# Patient Record
Sex: MALE | URBAN - METROPOLITAN AREA
[De-identification: names, ages, dates, MRNs, and addresses within clinical notes are randomized per-mention and may not be internally consistent; named-entity substitution may affect disease eponyms.]

---

## 2022-03-10 ENCOUNTER — APPOINTMENT (OUTPATIENT)
Dept: URBAN - METROPOLITAN AREA CLINIC 211 | Age: 47
Setting detail: DERMATOLOGY
End: 2022-03-13

## 2022-03-10 ENCOUNTER — APPOINTMENT (OUTPATIENT)
Dept: URBAN - METROPOLITAN AREA CLINIC 211 | Age: 47
Setting detail: DERMATOLOGY
End: 2022-03-11

## 2022-03-10 DIAGNOSIS — Z41.9 ENCOUNTER FOR PROCEDURE FOR PURPOSES OTHER THAN REMEDYING HEALTH STATE, UNSPECIFIED: ICD-10-CM

## 2022-03-10 DIAGNOSIS — Z71.89 OTHER SPECIFIED COUNSELING: ICD-10-CM

## 2022-03-10 PROCEDURE — OTHER SUNSCREEN RECOMMENDATIONS: OTHER

## 2022-03-10 PROCEDURE — OTHER COUNSELING: OTHER

## 2022-03-10 PROCEDURE — OTHER MIPS QUALITY: OTHER

## 2022-03-10 PROCEDURE — OTHER OTHER: OTHER

## 2022-03-10 PROCEDURE — OTHER OTHER (COSMETIC): OTHER

## 2022-03-10 ASSESSMENT — LOCATION DETAILED DESCRIPTION DERM
LOCATION DETAILED: LEFT POSTERIOR SHOULDER
LOCATION DETAILED: LEFT POSTERIOR SHOULDER

## 2022-03-10 ASSESSMENT — LOCATION SIMPLE DESCRIPTION DERM
LOCATION SIMPLE: LEFT SHOULDER
LOCATION SIMPLE: LEFT SHOULDER

## 2022-03-10 ASSESSMENT — LOCATION ZONE DERM
LOCATION ZONE: ARM
LOCATION ZONE: ARM

## 2022-03-10 NOTE — PROCEDURE: OTHER (COSMETIC)
BIBA as per pts daughter, has been feeling generalized weakness from home since Sunday. Pt fell 3 days ago.
Other (Free Text): Cosmetic Consultation\\nPE:dark hair on the upper arms and the shoulder, very well demarcated, rolling atrophic scars on the temple and CA on right and left cheek\\n\\nPLAN:\\n\\n1.  Rec laser hair removal for the upper arms and the shoulders x 6 treatments.  The procedure was reviewed in detail, r&b and post treatment expectations.  Rec pt come into office clean shaven.  Informational handout was given with a CQ.\\n\\n2.  Discussed the possibility of micro needling for the acne scars and if there is a defined edge to consider layering with TCA Cross.  Deferred final decision to when pt comes in to see Ioana for laser hair treatment to determine if she thinks the scars would be responsive to microneedling.\\n\\n3.  Rec electrocautery layered with IPL to 2 select Curtis on the bilateral cheeks.  The procedure was reviewed in detail, r&b and post treatment expectations.  Discussed that more than one treatment may be needed to reach desired outcome.\\n\\nNOTE:\\n\\Ricky indications, treatment expectations (including management of any possible irritations), protocols, risks and benefits, pre/post care are reviewed.  Patient understands that multiple treatments may be necessary for optimum results and that on going maintenance with at-home products and additional office visits or treatments may be needed to enhance and extend the desired results.\\n\\Ricky questions were addressed.\\n\\nRTC-TBD
Detail Level: Zone

## 2022-03-10 NOTE — HPI: OTHER
Condition:: Cosmetic
Please Describe Your Condition:: Pt presents for a consultation to discuss laser hair removal for his upper arms and shoulders.  Pt has previously had laser hair.  Pt is also interested in treatment options for his acne scars on his temples.  Medications, allergies and medical hx were reviewed.

## 2022-03-10 NOTE — PROCEDURE: MIPS QUALITY
Quality 226: Preventive Care And Screening: Tobacco Use: Screening And Cessation Intervention: Patient screened for tobacco use and is an ex/non-smoker
Quality 130: Documentation Of Current Medications In The Medical Record: Current Medications Documented
Detail Level: Detailed
Quality 431: Preventive Care And Screening: Unhealthy Alcohol Use - Screening: Patient not identified as an unhealthy alcohol user when screened for unhealthy alcohol use using a systematic screening method
Quality 110: Preventive Care And Screening: Influenza Immunization: Influenza Immunization previously received during influenza season
Quality 431: Preventive Care And Screening: Unhealthy Alcohol Use - Screening: Patient screened for unhealthy alcohol use using a single question and scores less than 2 times per year

## 2022-03-10 NOTE — PROCEDURE: OTHER
Note Text (......Xxx Chief Complaint.): This diagnosis correlates with the
Detail Level: Zone
Other (Free Text): Discussed importance of yearly skin exams\\nPatient has a few areas on cheeks that need further evaluation- recommended CBE to discuss
Render Risk Assessment In Note?: yes

## 2022-08-01 ENCOUNTER — APPOINTMENT (OUTPATIENT)
Dept: URBAN - METROPOLITAN AREA CLINIC 211 | Age: 47
Setting detail: DERMATOLOGY
End: 2022-08-01

## 2022-08-01 DIAGNOSIS — Z41.9 ENCOUNTER FOR PROCEDURE FOR PURPOSES OTHER THAN REMEDYING HEALTH STATE, UNSPECIFIED: ICD-10-CM

## 2022-08-01 PROCEDURE — OTHER OTHER (COSMETIC): OTHER

## 2022-08-01 NOTE — HPI: OTHER
Condition:: Cosmetics
Please Describe Your Condition:: is an established patient who is being seen for a chief complaint of Cosmetics. LHR upper arms and shoulders tx#1/6. He states having LHR done approx 10yrs ago at [facility] and said it didn't work. He didn't recall the details about the tx process.

## 2022-08-01 NOTE — PROCEDURE: OTHER (COSMETIC)
Other (Free Text): IPL utilizing Max R \\nIndication: Reduction of hair\\n\\nPrior to treatment, all but not limited to treatment indications and expectations(including management of any possible irritations) protocols, risks and benefits were reviewed in detail, including post treatment expectations. All questions were answered prior to administering the treatment.\\n\\nTreatment #\\nSite(s):\\n\\nLUX LOTION:\\nLot #:\\nExp:\\n\\nSettings:\\n\\nMelanin Index=\\n@  100 ms value =   j -  j\\n@ 20 ms value = j - j\\n\\nApplied  100  ms @   j x 1 pass\\nApplied  20 ms @ j x 1 pass\\n\\nPre, ami and post cooling was applied utilizing the  and/or the Lambert Chiller. Pt tolerated the procedure well with no immediate concerns. \\n\\Ricky indications, treatment expectations (including management of any possible irritations), protocols, risks and benefits, pre/post care were reviewed. Details of these can be found on the appropriate attached informed consent documentation. Patient understands that multiple treatments may be necessary for optimum results and that on going maintenance with at-home products and additional office visits or treatments may be needed to enhance and extend the desired results.\\n\\nStandard protocol was done. The eyes were covered with IPL specific eye shields. Following treatment, the expected mild erythema and edema was observed. Post treatment a moisturizing sunblock was applied. Patient tolerated the procedure well without immediate complication. Post care was reviewed and a follow up appointment was recommended.\\n\\nRTC:
Detail Level: Zone

## 2022-09-22 ENCOUNTER — APPOINTMENT (OUTPATIENT)
Dept: URBAN - METROPOLITAN AREA CLINIC 211 | Age: 47
Setting detail: DERMATOLOGY
End: 2022-09-23

## 2022-09-22 DIAGNOSIS — Z41.9 ENCOUNTER FOR PROCEDURE FOR PURPOSES OTHER THAN REMEDYING HEALTH STATE, UNSPECIFIED: ICD-10-CM

## 2022-09-22 PROCEDURE — OTHER MIPS QUALITY: OTHER

## 2022-09-22 PROCEDURE — OTHER OTHER: OTHER

## 2022-09-22 NOTE — PROCEDURE: MIPS QUALITY
Quality 431: Preventive Care And Screening: Unhealthy Alcohol Use - Screening: Patient not identified as an unhealthy alcohol user when screened for unhealthy alcohol use using a systematic screening method
Quality 110: Preventive Care And Screening: Influenza Immunization: Influenza Immunization previously received during influenza season
Detail Level: Detailed
Quality 130: Documentation Of Current Medications In The Medical Record: Current Medications Documented
Quality 402: Tobacco Use And Help With Quitting Among Adolescents: Patient screened for tobacco and is an ex-smoker
Quality 226: Preventive Care And Screening: Tobacco Use: Screening And Cessation Intervention: Patient screened for tobacco use and is an ex/non-smoker

## 2022-09-22 NOTE — PROCEDURE: OTHER
Note Text (......Xxx Chief Complaint.): This diagnosis correlates with the
Detail Level: Zone
Render Risk Assessment In Note?: no
Other (Free Text): IPL utilizing Max R \\nIndication: Reduction of hair\\n\\nPrior to treatment, all but not limited to treatment indications and expectations(including management of any possible irritations) protocols, risks and benefits were reviewed in detail, including post treatment expectations. All questions were answered prior to administering the treatment.\\n\\nTreatment #\\nSite(s):\\n\\nLUX LOTION:\\nLot #:\\nExp:\\n\\nSettings:\\n\\nMelanin Index=\\n@  100 ms value =   j -  j\\n@ 20 ms value = j - j\\n\\nApplied  100  ms @   j x 1 pass\\nApplied  20 ms @ j x 1 pass\\n\\nPre, ami and post cooling was applied utilizing the  and/or the Lambert Chiller. Pt tolerated the procedure well with no immediate concerns. \\n\\Ricky indications, treatment expectations (including management of any possible irritations), protocols, risks and benefits, pre/post care were reviewed. Details of these can be found on the appropriate attached informed consent documentation. Patient understands that multiple treatments may be necessary for optimum results and that on going maintenance with at-home products and additional office visits or treatments may be needed to enhance and extend the desired results.\\n\\nStandard protocol was done. The eyes were covered with IPL specific eye shields. Following treatment, the expected mild erythema and edema was observed. Post treatment a moisturizing sunblock was applied. Patient tolerated the procedure well without immediate complication. Post care was reviewed and a follow up appointment was recommended.\\n\\nRTC:

## 2022-09-22 NOTE — HPI: OTHER
Condition:: Laser hair removal
Please Describe Your Condition:: is an established patient who is being seen for a chief complaint of Laser hair removal tx#2/6. Pt confirms no comp following tx and reports slowing of hair growth. Pt is very pleased. \\n\\nNo additional concerns. Medications, allergies and history reviewed. Consent filled.

## 2022-11-17 ENCOUNTER — APPOINTMENT (OUTPATIENT)
Dept: URBAN - METROPOLITAN AREA CLINIC 211 | Age: 47
Setting detail: DERMATOLOGY
End: 2022-11-17

## 2022-11-17 DIAGNOSIS — Z41.9 ENCOUNTER FOR PROCEDURE FOR PURPOSES OTHER THAN REMEDYING HEALTH STATE, UNSPECIFIED: ICD-10-CM

## 2022-11-17 PROCEDURE — OTHER MIPS QUALITY: OTHER

## 2022-11-17 PROCEDURE — OTHER OTHER: OTHER

## 2022-11-17 NOTE — HPI: OTHER
Condition:: Cosmetic
Please Describe Your Condition:: Patient presents for laser hair removal # 3/6 for the shoulders and back.  Patient states that he is seeing improvement in some areas and less hair but some areas still seem the same.  Medications, allergies and medical hx were reviewed.

## 2022-11-17 NOTE — PROCEDURE: OTHER
Note Text (......Xxx Chief Complaint.): This diagnosis correlates with the
Render Risk Assessment In Note?: no
Detail Level: Zone
Other (Free Text): IPL utilizing Max R \\nIndication: Reduction of hair\\n\\nPrior to treatment, all but not limited to treatment indications and expectations(including management of any possible irritations) protocols, risks and benefits were reviewed in detail, including post treatment expectations. All questions were answered prior to administering the treatment.\\n\\nTreatment #\\nSite(s):\\n\\nLUX LOTION:\\nLot #:\\nExp:\\n\\nSettings:\\n\\nMelanin Index=\\n@  100 ms value =   j -  j\\n@ 20 ms value = j - j\\n\\nApplied  100  ms @   j x 1 pass\\nApplied  20 ms @ j x 1 pass\\n\\nPre, ami and post cooling was applied utilizing the  and/or the Lambert Chiller. Pt tolerated the procedure well with no immediate concerns. \\n\\Ricky indications, treatment expectations (including management of any possible irritations), protocols, risks and benefits, pre/post care were reviewed. Details of these can be found on the appropriate attached informed consent documentation. Patient understands that multiple treatments may be necessary for optimum results and that on going maintenance with at-home products and additional office visits or treatments may be needed to enhance and extend the desired results.\\n\\nStandard protocol was done. The eyes were covered with IPL specific eye shields. Following treatment, the expected mild erythema and edema was observed. Post treatment a moisturizing sunblock was applied. Patient tolerated the procedure well without immediate complication. Post care was reviewed and a follow up appointment was recommended.\\n\\nRTC:
No

## 2023-02-27 ENCOUNTER — APPOINTMENT (OUTPATIENT)
Dept: URBAN - METROPOLITAN AREA CLINIC 211 | Age: 48
Setting detail: DERMATOLOGY
End: 2023-02-28

## 2023-02-27 DIAGNOSIS — Z41.9 ENCOUNTER FOR PROCEDURE FOR PURPOSES OTHER THAN REMEDYING HEALTH STATE, UNSPECIFIED: ICD-10-CM

## 2023-02-27 PROCEDURE — OTHER OTHER: OTHER

## 2023-02-27 PROCEDURE — OTHER MIPS QUALITY: OTHER

## 2023-02-27 NOTE — HPI: OTHER
Condition:: Cosmetic
Please Describe Your Condition:: is an established patient who is being seen for a chief complaint of Cosmetic. LHR #4/6 for shoulders and deltoids. He notes recent increased density with overall having approx 1/3 less regrowth.

## 2023-06-28 ENCOUNTER — APPOINTMENT (OUTPATIENT)
Dept: URBAN - METROPOLITAN AREA CLINIC 211 | Age: 48
Setting detail: DERMATOLOGY
End: 2023-06-28

## 2023-06-28 DIAGNOSIS — Z41.9 ENCOUNTER FOR PROCEDURE FOR PURPOSES OTHER THAN REMEDYING HEALTH STATE, UNSPECIFIED: ICD-10-CM

## 2023-06-28 PROCEDURE — OTHER MIPS QUALITY: OTHER

## 2023-06-28 PROCEDURE — OTHER OTHER: OTHER

## 2023-06-28 NOTE — PROCEDURE: OTHER
Other (Free Text): IPL utilizing Max R \\nIndication: Reduction of hair\\n\\nPrior to treatment, all but not limited to treatment indications and expectations(including management of any possible irritations) protocols, risks and benefits were reviewed in detail, including post treatment expectations. All questions were answered prior to administering the treatment.\\n\\nTreatment #\\nSite(s):\\n\\nLUX LOTION:\\nLot #:\\nExp:\\n\\nSettings:\\n\\nMelanin Index=\\n@  100 ms value =   j -  j\\n@ 20 ms value = j - j\\n\\nApplied  100  ms @   j x 1 pass\\nApplied  20 ms @ j x 1 pass\\n\\nPre, ami and post cooling was applied utilizing the  and/or the Lambetr Chiller. Pt tolerated the procedure well with no immediate concerns. \\n\\Ricky indications, treatment expectations (including management of any possible irritations), protocols, risks and benefits, pre/post care were reviewed. Details of these can be found on the appropriate attached informed consent documentation. Patient understands that multiple treatments may be necessary for optimum results and that on going maintenance with at-home products and additional office visits or treatments may be needed to enhance and extend the desired results.\\n\\nStandard protocol was done. The eyes were covered with IPL specific eye shields. Following treatment, the expected mild erythema and edema was observed. Post treatment a moisturizing sunblock was applied. Patient tolerated the procedure well without immediate complication. Post care was reviewed and a follow up appointment was recommended.\\n\\nRTC: Other (Free Text): IPL utilizing Max R \\nIndication: Reduction of hair\\n\\nPrior to treatment, all but not limited to treatment indications and expectations(including management of any possible irritations) protocols, risks and benefits were reviewed in detail, including post treatment expectations. All questions were answered prior to administering the treatment.\\n\\nTreatment #\\nSite(s):\\n\\nLUX LOTION:\\nLot #:\\nExp:\\n\\nSettings:\\n\\nMelanin Index=\\n@  100 ms value =   j -  j\\n@ 20 ms value = j - j\\n\\nApplied  100  ms @   j x 1 pass\\nApplied  20 ms @ j x 1 pass\\n\\nPre, ami and post cooling was applied utilizing the  and/or the Lambert Chiller. Pt tolerated the procedure well with no immediate concerns. \\n\\Ricky indications, treatment expectations (including management of any possible irritations), protocols, risks and benefits, pre/post care were reviewed. Details of these can be found on the appropriate attached informed consent documentation. Patient understands that multiple treatments may be necessary for optimum results and that on going maintenance with at-home products and additional office visits or treatments may be needed to enhance and extend the desired results.\\n\\nStandard protocol was done. The eyes were covered with IPL specific eye shields. Following treatment, the expected mild erythema and edema was observed. Post treatment a moisturizing sunblock was applied. Patient tolerated the procedure well without immediate complication. Post care was reviewed and a follow up appointment was recommended.\\n\\nRTC:

## 2023-06-28 NOTE — HPI: OTHER
Condition:: cosmetic
Please Describe Your Condition:: is an established patient who is being seen for a chief complaint of cosmetic . LHR #5/6 for shoulders extending to bilat triceps. Pt reports much slower regrowth with greater than 55% reduction. He is pleased but wishes it was greater reduction

## 2023-09-13 ENCOUNTER — APPOINTMENT (OUTPATIENT)
Dept: URBAN - METROPOLITAN AREA CLINIC 211 | Age: 48
Setting detail: DERMATOLOGY
End: 2023-09-13

## 2023-09-13 DIAGNOSIS — Z41.9 ENCOUNTER FOR PROCEDURE FOR PURPOSES OTHER THAN REMEDYING HEALTH STATE, UNSPECIFIED: ICD-10-CM

## 2023-09-13 PROCEDURE — OTHER MIPS QUALITY: OTHER

## 2023-09-13 PROCEDURE — OTHER OTHER: OTHER

## 2023-09-13 NOTE — PROCEDURE: OTHER
Render Risk Assessment In Note?: no
Detail Level: Zone
Other (Free Text): IPL utilizing Max R \\nIndication: Reduction of hair\\n\\nPrior to treatment, all but not limited to treatment indications and expectations(including management of any possible irritations) protocols, risks and benefits were reviewed in detail, including post treatment expectations. All questions were answered prior to administering the treatment.\\n\\nTreatment #\\nSite(s):\\n\\nLUX LOTION:\\nLot #:\\nExp:\\n\\nSettings:\\n\\nMelanin Index=\\n@  100 ms value =   j -  j\\n@ 20 ms value = j - j\\n\\nApplied  100  ms @   j x 1 pass\\nApplied  20 ms @ j x 1 pass\\n\\nPre, ami and post cooling was applied utilizing the  and/or the Lambert Chiller. Pt tolerated the procedure well with no immediate concerns. \\n\\Ricky indications, treatment expectations (including management of any possible irritations), protocols, risks and benefits, pre/post care were reviewed. Details of these can be found on the appropriate attached informed consent documentation. Patient understands that multiple treatments may be necessary for optimum results and that on going maintenance with at-home products and additional office visits or treatments may be needed to enhance and extend the desired results.\\n\\nStandard protocol was done. The eyes were covered with IPL specific eye shields. Following treatment, the expected mild erythema and edema was observed. Post treatment a moisturizing sunblock was applied. Patient tolerated the procedure well without immediate complication. Post care was reviewed and a follow up appointment was recommended.\\n\\nRTC:
Note Text (......Xxx Chief Complaint.): This diagnosis correlates with the
Other (Free Text): Cosmetic Consultation\\nPE:\\n\\nPLAN:\\n\\n1.\\n\\n2.\\n\\n3.\\n\\nNOTE:\\n\\Ricky indications, treatment expectations (including management of any possible irritations), protocols, risks and benefits, pre/post care are reviewed.  Patient understands that multiple treatments may be necessary for optimum results and that on going maintenance with at-home products and additional office visits or treatments may be needed to enhance and extend the desired results.\\n\\Ricky questions were addressed.\\n\\nRTC-

## 2023-09-13 NOTE — HPI: OTHER
Condition:: Cosmetic
Please Describe Your Condition:: is an established patient who is being seen for a chief complaint of Cosmetic . LHR  tx#6/6 Site(s): shoulders extending to triceps Pt reports 80% or greater hair regrowth reduction and is very pleased. \\nPt also wishes to discuss tx options for cherry angiomas on his trunk and get a quote for LHR for the lower medial back